# Patient Record
Sex: FEMALE | Race: WHITE | NOT HISPANIC OR LATINO | URBAN - METROPOLITAN AREA
[De-identification: names, ages, dates, MRNs, and addresses within clinical notes are randomized per-mention and may not be internally consistent; named-entity substitution may affect disease eponyms.]

---

## 2018-03-16 RX ORDER — CITALOPRAM 10 MG/1
TABLET ORAL
Qty: 90 TABLET | Refills: 3 | OUTPATIENT
Start: 2018-03-16

## 2018-03-16 RX ORDER — METFORMIN HYDROCHLORIDE 750 MG/1
TABLET, EXTENDED RELEASE ORAL
Qty: 180 TABLET | Refills: 3 | OUTPATIENT
Start: 2018-03-16

## 2018-03-16 RX ORDER — SPIRONOLACTONE 50 MG/1
TABLET, FILM COATED ORAL
Qty: 90 TABLET | Refills: 0 | OUTPATIENT
Start: 2018-03-16

## 2018-03-16 NOTE — TELEPHONE ENCOUNTER
Rx Refused: last seen over 1 year ago. Requested prescription has not been sent to pharmacy. Please contact patient to schedule an appointment. Offer 2 week supply if needed. Thanks.

## 2018-03-22 ENCOUNTER — TELEPHONE (OUTPATIENT)
Dept: FAMILY MEDICINE | Facility: CLINIC | Age: 38
End: 2018-03-22

## 2018-03-22 PROBLEM — L70.0 ACNE VULGARIS: Status: ACTIVE | Noted: 2018-03-22

## 2018-03-22 RX ORDER — CITALOPRAM 10 MG/1
10 TABLET ORAL DAILY
Qty: 30 TABLET | Refills: 0 | Status: SHIPPED | OUTPATIENT
Start: 2018-03-22 | End: 2018-04-09 | Stop reason: SDUPTHER

## 2018-03-22 RX ORDER — DAPSONE 50 MG/G
GEL TOPICAL
COMMUNITY
Start: 2015-05-01 | End: 2018-04-09 | Stop reason: ALTCHOICE

## 2018-03-22 RX ORDER — DROSPIRENONE AND ETHINYL ESTRADIOL 0.02-3(28)
KIT ORAL
COMMUNITY
Start: 2015-05-01 | End: 2018-04-09 | Stop reason: ALTCHOICE

## 2018-03-22 RX ORDER — TAZAROTENE 0.5 MG/G
CREAM TOPICAL
COMMUNITY
Start: 2015-05-01 | End: 2018-04-09 | Stop reason: ALTCHOICE

## 2018-03-22 RX ORDER — SPIRONOLACTONE 50 MG/1
1 TABLET, FILM COATED ORAL ONCE
COMMUNITY
Start: 2017-12-25 | End: 2018-03-22 | Stop reason: SDUPTHER

## 2018-03-22 RX ORDER — HYDROCORTISONE ACETATE 25 MG/1
SUPPOSITORY RECTAL
COMMUNITY
Start: 2015-05-01 | End: 2018-04-09 | Stop reason: ALTCHOICE

## 2018-03-22 RX ORDER — METFORMIN HYDROCHLORIDE 750 MG/1
TABLET, EXTENDED RELEASE ORAL
COMMUNITY
Start: 2017-02-13 | End: 2018-04-09 | Stop reason: SDUPTHER

## 2018-03-22 RX ORDER — SPIRONOLACTONE 50 MG/1
50 TABLET, FILM COATED ORAL DAILY
Qty: 30 TABLET | Refills: 0 | Status: SHIPPED | OUTPATIENT
Start: 2018-03-22 | End: 2018-04-09

## 2018-03-22 RX ORDER — CITALOPRAM 10 MG/1
1 TABLET ORAL ONCE
COMMUNITY
Start: 2017-02-13 | End: 2018-03-22 | Stop reason: SDUPTHER

## 2018-03-22 NOTE — TELEPHONE ENCOUNTER
Pt called requesting a weeks supply of her:  spironolactone 50 mg tablet and citalopram 10 mg tablet    She is completely out of her medication and has an apt on 3/29/2018    Thanks!

## 2018-04-09 ENCOUNTER — OFFICE VISIT (OUTPATIENT)
Dept: FAMILY MEDICINE | Facility: CLINIC | Age: 38
End: 2018-04-09
Payer: COMMERCIAL

## 2018-04-09 VITALS
DIASTOLIC BLOOD PRESSURE: 70 MMHG | SYSTOLIC BLOOD PRESSURE: 112 MMHG | TEMPERATURE: 98.3 F | BODY MASS INDEX: 30.48 KG/M2 | WEIGHT: 172 LBS | HEART RATE: 71 BPM | HEIGHT: 63 IN | OXYGEN SATURATION: 99 %

## 2018-04-09 DIAGNOSIS — Z00.00 ROUTINE MEDICAL EXAM: Primary | ICD-10-CM

## 2018-04-09 DIAGNOSIS — F41.9 ANXIETY DISORDER, UNSPECIFIED TYPE: ICD-10-CM

## 2018-04-09 DIAGNOSIS — L70.0 ACNE VULGARIS: ICD-10-CM

## 2018-04-09 DIAGNOSIS — Z01.419 PAP SMEAR, LOW-RISK: ICD-10-CM

## 2018-04-09 PROCEDURE — 99395 PREV VISIT EST AGE 18-39: CPT | Performed by: FAMILY MEDICINE

## 2018-04-09 RX ORDER — SPIRONOLACTONE 50 MG/1
50 TABLET, FILM COATED ORAL 2 TIMES DAILY
Qty: 180 TABLET | Refills: 3 | Status: SHIPPED | OUTPATIENT
Start: 2018-04-09 | End: 2019-04-05 | Stop reason: SDUPTHER

## 2018-04-09 RX ORDER — CITALOPRAM 10 MG/1
10 TABLET ORAL DAILY
Qty: 90 TABLET | Refills: 3 | Status: SHIPPED | OUTPATIENT
Start: 2018-04-09 | End: 2019-04-05 | Stop reason: SDUPTHER

## 2018-04-09 RX ORDER — METFORMIN HYDROCHLORIDE 750 MG/1
750 TABLET, EXTENDED RELEASE ORAL
Qty: 90 TABLET | Refills: 3 | Status: SHIPPED | OUTPATIENT
Start: 2018-04-09 | End: 2018-04-10 | Stop reason: SDUPTHER

## 2018-04-09 RX ORDER — SPIRONOLACTONE 50 MG/1
50 TABLET, FILM COATED ORAL 2 TIMES DAILY
Qty: 30 TABLET | Refills: 0 | COMMUNITY
Start: 2018-04-09 | End: 2018-04-09 | Stop reason: SDUPTHER

## 2018-04-09 ASSESSMENT — ENCOUNTER SYMPTOMS
DIARRHEA: 0
COLOR CHANGE: 0
VOMITING: 0
CONSTIPATION: 0
BLOOD IN STOOL: 0
NERVOUS/ANXIOUS: 0
COUGH: 0
SHORTNESS OF BREATH: 0
SORE THROAT: 0
CHILLS: 0
HEMATURIA: 0
PALPITATIONS: 0
DIZZINESS: 0
DIFFICULTY URINATING: 0
UNEXPECTED WEIGHT CHANGE: 0
TROUBLE SWALLOWING: 0
CHEST TIGHTNESS: 0
NAUSEA: 0
ADENOPATHY: 0
WHEEZING: 0
HEADACHES: 0
JOINT SWELLING: 0
ARTHRALGIAS: 0
ABDOMINAL PAIN: 0
FEVER: 0
EYE REDNESS: 0

## 2018-04-09 NOTE — PROGRESS NOTES
[facility]     CHIEF COMPLAINT   Treasure Winter is a 38 y.o. female who presents today for routine annual physical.   HISTORY OF PRESENT ILLNESS      Acne well managed on present regimen.         PAST MEDICAL AND SURGICAL HISTORY        No past medical history on file.    No past surgical history on file.    MEDICATIONS        Current Outpatient Prescriptions:   •  citalopram (CELEXA) 10 mg tablet, Take 1 tablet (10 mg total) by mouth daily., Disp: 90 tablet, Rfl: 3  •  metFORMIN XR (GLUCOPHAGE-XR) 750 mg 24 hr tablet, Take 1 tablet (750 mg total) by mouth daily with breakfast., Disp: 90 tablet, Rfl: 3  •  spironolactone (ALDACTONE) 50 mg tablet, Take 1 tablet (50 mg total) by mouth 2 (two) times a day., Disp: 180 tablet, Rfl: 3    ALLERGIES      Patient has no known allergies.    FAMILY HISTORY      Family History   Problem Relation Age of Onset   • Hypertension Mother    • Diabetes Mother        SOCIAL HISTORY      Social History     Social History   • Marital status: Single     Spouse name: N/A   • Number of children: N/A   • Years of education: N/A     Social History Main Topics   • Smoking status: Never Smoker   • Smokeless tobacco: Never Used   • Alcohol use No   • Drug use: Unknown   • Sexual activity: Not on file     Other Topics Concern   • Not on file     Social History Narrative   • No narrative on file       REVIEW OF SYSTEMS      .Review of Systems   Constitutional: Negative for chills, fever and unexpected weight change.   HENT: Negative for congestion, ear pain, sore throat and trouble swallowing.    Eyes: Negative for redness and visual disturbance.   Respiratory: Negative for cough, chest tightness, shortness of breath and wheezing.    Cardiovascular: Negative for chest pain, palpitations and leg swelling.   Gastrointestinal: Negative for abdominal pain, blood in stool, constipation, diarrhea, nausea and vomiting.   Genitourinary: Negative for difficulty urinating and hematuria.  "  Musculoskeletal: Negative for arthralgias and joint swelling.   Skin: Negative for color change and rash.        Acne vulgaris - face   Neurological: Negative for dizziness and headaches.   Hematological: Negative for adenopathy.   Psychiatric/Behavioral: The patient is not nervous/anxious.        PHYSICAL EXAMINATION      Vitals:    04/09/18 1552   BP: 112/70   Pulse: 71   Temp: 36.8 °C (98.3 °F)   SpO2: 99%   Weight: 78 kg (172 lb)   Height: 1.6 m (5' 3\")     Body mass index is 30.47 kg/m².    Physical Exam   Constitutional: She is oriented to person, place, and time. She appears well-developed and well-nourished.   HENT:   Head: Normocephalic.   Right Ear: External ear normal.   Left Ear: External ear normal.   Nose: Nose normal.   Mouth/Throat: Oropharynx is clear and moist.   Eyes: Conjunctivae are normal. Pupils are equal, round, and reactive to light.   Neck: Normal range of motion.   Cardiovascular: Normal rate, regular rhythm and normal heart sounds.    Pulmonary/Chest: Effort normal and breath sounds normal.   Musculoskeletal: Normal range of motion.   Neurological: She is alert and oriented to person, place, and time.   Skin: Skin is warm and dry.   Acne vulgaris presently well controlled.    Psychiatric: She has a normal mood and affect. Her behavior is normal.          ASSESSMENT AND PLAN   Assessment/Plan   Problem List Items Addressed This Visit     Anxiety disorder    Relevant Medications    citalopram (CELEXA) 10 mg tablet    Other Relevant Orders    CBC and Differential    Comprehensive metabolic panel    Lipid panel    Acne vulgaris    Relevant Orders    CBC and Differential    Comprehensive metabolic panel    Lipid panel      Other Visit Diagnoses     Routine medical exam    -  Primary    Relevant Orders    CBC and Differential    Comprehensive metabolic panel    Lipid panel    Pap smear, low-risk        Relevant Orders    Ambulatory referral to Gynecology           Health maintenance discussed " including:    Patient encouraged to increase activity gradually as tolerated with a goal of 150 min. of aerobic activity per weeks and 2 sessions of resistance exercise weekly. Counseled patient on healthy eating (high quality, low fat, low carb foods) and the importance of portion control.    Importance of completing recommended health maintenance measures.    Completing regular dental examinations and brushing and flossing daily.    Enhance safety by wearing seat belts, checking smoke and CO detectors and monitoring living space for potential fall risks.     Matt Krueger,   04/09/18  7:28 PM

## 2018-04-09 NOTE — PATIENT INSTRUCTIONS
Patient Education   Patient Education     Acne  Acne is a skin problem that causes small, red bumps (pimples). Acne happens when the tiny holes in your skin (pores) get blocked. Your pores may become red, sore, and swollen. They may also become infected. Acne is a common skin problem. It is especially common in teenagers. Acne usually goes away over time.  Follow these instructions at home:  Good skin care is the most important thing you can do to treat your acne. Take care of your skin as told by your doctor. You may be told to do these things:  · Wash your skin gently at least two times each day. You should also wash your skin:  ¨ After you exercise.  ¨ Before you go to bed.  · Use mild soap.  · Use a water-based skin moisturizer after you wash your skin.  · Use a sunscreen or sunblock with SPF 30 or greater. This is very important if you are using acne medicines.  · Choose cosmetics that will not plug your oil glands (are noncomedogenic).  Medicines  · Take over-the-counter and prescription medicines only as told by your doctor.  · If you were prescribed an antibiotic medicine, apply or take it as told by your doctor. Do not stop using the antibiotic even if your acne improves.  General instructions  · Keep your hair clean and off of your face. Shampoo your hair regularly. If you have oily hair, you may need to wash it every day.  · Avoid leaning your chin or forehead on your hands.  · Avoid wearing tight headbands or hats.  · Avoid picking or squeezing your pimples. That can make your acne worse and cause scarring.  · Keep all follow-up visits as told by your doctor. This is important.  · Shave gently. Only shave when it is necessary.  · Keep a food journal. This can help you to see if any foods are linked with your acne.  Contact a doctor if:  · Your acne is not better after eight weeks.  · Your acne gets worse.  · You have a large area of skin that is red or tender.  · You think that you are having side  effects from any acne medicine.  This information is not intended to replace advice given to you by your health care provider. Make sure you discuss any questions you have with your health care provider.  Document Released: 12/06/2012 Document Revised: 05/25/2017 Document Reviewed: 02/24/2016  [employer] Interactive Patient Education © 2018 [employer]       Generalized Anxiety Disorder, Adult  Generalized anxiety disorder (RAJI) is a mental health disorder. People with this condition constantly worry about everyday events. Unlike normal anxiety, worry related to RAJI is not triggered by a specific event. These worries also do not fade or get better with time. RAJI interferes with life functions, including relationships, work, and school.  RAJI can vary from mild to severe. People with severe RAJI can have intense waves of anxiety with physical symptoms (panic attacks).  What are the causes?  The exact cause of RAJI is not known.  What increases the risk?  This condition is more likely to develop in:  · Women.  · People who have a family history of anxiety disorders.  · People who are very shy.  · People who experience very stressful life events, such as the death of a loved one.  · People who have a very stressful family environment.  What are the signs or symptoms?  People with RAJI often worry excessively about many things in their lives, such as their health and family. They may also be overly concerned about:  · Doing well at work.  · Being on time.  · Natural disasters.  · Friendships.  Physical symptoms of RAJI include:  · Fatigue.  · Muscle tension or having muscle twitches.  · Trembling or feeling shaky.  · Being easily startled.  · Feeling like your heart is pounding or racing.  · Feeling out of breath or like you cannot take a deep breath.  · Having trouble falling asleep or staying asleep.  · Sweating.  · Nausea, diarrhea, or irritable bowel syndrome (IBS).  · Headaches.  · Trouble concentrating or remembering  facts.  · Restlessness.  · Irritability.  How is this diagnosed?  Your health care provider can diagnose RAJI based on your symptoms and medical history. You will also have a physical exam. The health care provider will ask specific questions about your symptoms, including how severe they are, when they started, and if they come and go. Your health care provider may ask you about your use of alcohol or drugs, including prescription medicines. Your health care provider may refer you to a mental health specialist for further evaluation.  Your health care provider will do a thorough examination and may perform additional tests to rule out other possible causes of your symptoms.  To be diagnosed with RAJI, a person must have anxiety that:  · Is out of his or her control.  · Affects several different aspects of his or her life, such as work and relationships.  · Causes distress that makes him or her unable to take part in normal activities.  · Includes at least three physical symptoms of RAJI, such as restlessness, fatigue, trouble concentrating, irritability, muscle tension, or sleep problems.  Before your health care provider can confirm a diagnosis of RAJI, these symptoms must be present more days than they are not, and they must last for six months or longer.  How is this treated?  The following therapies are usually used to treat RAJI:  · Medicine. Antidepressant medicine is usually prescribed for long-term daily control. Antianxiety medicines may be added in severe cases, especially when panic attacks occur.  · Talk therapy (psychotherapy). Certain types of talk therapy can be helpful in treating RAJI by providing support, education, and guidance. Options include:  ¨ Cognitive behavioral therapy (CBT). People learn coping skills and techniques to ease their anxiety. They learn to identify unrealistic or negative thoughts and behaviors and to replace them with positive ones.  ¨ Acceptance and commitment therapy (ACT).  This treatment teaches people how to be mindful as a way to cope with unwanted thoughts and feelings.  ¨ Biofeedback. This process trains you to manage your body's response (physiological response) through breathing techniques and relaxation methods. You will work with a therapist while machines are used to monitor your physical symptoms.  · Stress management techniques. These include yoga, meditation, and exercise.  A mental health specialist can help determine which treatment is best for you. Some people see improvement with one type of therapy. However, other people require a combination of therapies.  Follow these instructions at home:  · Take over-the-counter and prescription medicines only as told by your health care provider.  · Try to maintain a normal routine.  · Try to anticipate stressful situations and allow extra time to manage them.  · Practice any stress management or self-calming techniques as taught by your health care provider.  · Do not punish yourself for setbacks or for not making progress.  · Try to recognize your accomplishments, even if they are small.  · Keep all follow-up visits as told by your health care provider. This is important.  Contact a health care provider if:  · Your symptoms do not get better.  · Your symptoms get worse.  · You have signs of depression, such as:  ¨ A persistently sad, cranky, or irritable mood.  ¨ Loss of enjoyment in activities that used to bring you bob.  ¨ Change in weight or eating.  ¨ Changes in sleeping habits.  ¨ Avoiding friends or family members.  ¨ Loss of energy for normal tasks.  ¨ Feelings of guilt or worthlessness.  Get help right away if:  · You have serious thoughts about hurting yourself or others.  If you ever feel like you may hurt yourself or others, or have thoughts about taking your own life, get help right away. You can go to your nearest emergency department or call:  · Your local emergency services (911 in the [place]  · A suicide crisis  helpline, such as the National Suicide Prevention Lifeline at 1-978.260.7735. This is open 24 hours a day.  Summary  · Generalized anxiety disorder (RAJI) is a mental health disorder that involves worry that is not triggered by a specific event.  · People with RAJI often worry excessively about many things in their lives, such as their health and family.  · RAJI may cause physical symptoms such as restlessness, trouble concentrating, sleep problems, frequent sweating, nausea, diarrhea, headaches, and trembling or muscle twitching.  · A mental health specialist can help determine which treatment is best for you. Some people see improvement with one type of therapy. However, other people require a combination of therapies.  This information is not intended to replace advice given to you by your health care provider. Make sure you discuss any questions you have with your health care provider.  Document Released: 04/14/2014 Document Revised: 11/07/2017 Document Reviewed: 11/07/2017  [employer] Interactive Patient Education © 2018 [employer]

## 2018-04-09 NOTE — ASSESSMENT & PLAN NOTE
Anxiety: stop or reduce your consumption of products that contain caffeine, such as coffee, tea, cola and chocolate. Incorporate regular exercise into your lifestyle. Consume a calorie appropriate diet of high quality, low fat, low carb foods. Participate in counseling as needed. Practice stress management techniques such as yoga, meditation or martial arts. Consider keeping a journal. Seek the assistance of a psychologist or psychiatrist if the above are not improving condition.

## 2018-04-10 ENCOUNTER — TELEPHONE (OUTPATIENT)
Dept: FAMILY MEDICINE | Facility: CLINIC | Age: 38
End: 2018-04-10

## 2018-04-10 RX ORDER — METFORMIN HYDROCHLORIDE 750 MG/1
750 TABLET, EXTENDED RELEASE ORAL 2 TIMES DAILY WITH MEALS
Qty: 180 TABLET | Refills: 3 | Status: SHIPPED | OUTPATIENT
Start: 2018-04-10 | End: 2019-04-05 | Stop reason: SDUPTHER

## 2018-04-10 NOTE — TELEPHONE ENCOUNTER
metFORMIN XR (GLUCOPHAGE-XR) 750 mg 24 hr tablet        Sig: Take 1 tablet (750 mg total) by mouth daily with           Pharmacy called about the above medication. They are inquiring about the daily instructions. Pharm stated she was taking the med 2x daily and wants to know why is it down to 1x daily

## 2018-04-10 NOTE — TELEPHONE ENCOUNTER
Please inform the pharmacy that a new prescription has been sent with twice daily dosing.  Thank you

## 2018-04-10 NOTE — TELEPHONE ENCOUNTER
Please inform the pharmacy that a new prescription has been sent with twice daily dosing.  Thank you.

## 2018-04-17 ENCOUNTER — TELEPHONE (OUTPATIENT)
Dept: FAMILY MEDICINE | Facility: CLINIC | Age: 38
End: 2018-04-17

## 2018-04-17 DIAGNOSIS — Z12.39 BREAST SCREENING: ICD-10-CM

## 2018-04-17 DIAGNOSIS — Z01.419 PAP SMEAR, LOW-RISK: Primary | ICD-10-CM

## 2018-04-17 NOTE — TELEPHONE ENCOUNTER
Pt called requesting a Rx for a mammo and an order to go to the Ob-Gyn per her insurance company's request      Thanks!

## 2019-01-16 ENCOUNTER — OFFICE VISIT (OUTPATIENT)
Dept: FAMILY MEDICINE | Facility: CLINIC | Age: 39
End: 2019-01-16
Payer: COMMERCIAL

## 2019-01-16 VITALS
BODY MASS INDEX: 31.89 KG/M2 | SYSTOLIC BLOOD PRESSURE: 102 MMHG | TEMPERATURE: 98.5 F | OXYGEN SATURATION: 97 % | DIASTOLIC BLOOD PRESSURE: 72 MMHG | WEIGHT: 180 LBS | HEIGHT: 63 IN | HEART RATE: 88 BPM

## 2019-01-16 DIAGNOSIS — Z12.39 BREAST CANCER SCREENING: ICD-10-CM

## 2019-01-16 DIAGNOSIS — E28.2 POLYCYSTIC OVARIES: Primary | ICD-10-CM

## 2019-01-16 DIAGNOSIS — F41.9 ANXIETY DISORDER, UNSPECIFIED TYPE: ICD-10-CM

## 2019-01-16 DIAGNOSIS — L70.0 ACNE VULGARIS: ICD-10-CM

## 2019-01-16 DIAGNOSIS — Z00.00 GENERAL MEDICAL EXAMINATION: ICD-10-CM

## 2019-01-16 DIAGNOSIS — R00.2 PALPITATION: ICD-10-CM

## 2019-01-16 PROCEDURE — 99213 OFFICE O/P EST LOW 20 MIN: CPT | Mod: GE | Performed by: FAMILY MEDICINE

## 2019-01-16 NOTE — ASSESSMENT & PLAN NOTE
Well controlled with spironolactone  Ordered labs for evaluation of K especially given the recent history of palpitations

## 2019-01-16 NOTE — PROGRESS NOTES
"     [facility]     CHIEF COMPLAINT   Follow [place]     HISTORY OF PRESENT ILLNESS      This is a 38 y.o. female who presents for follow up before leaving the area. Would like to ensure is UTD with HCM.     Chest tightness which occurs \"out of the blue\" for months. Usually when she is inhaling and passes within a few seconds. Denies n/v, dizziness. Pain is L sided and stops her breath. Can occur at rest or during exertion. Unsure if heartbeat is irregular, not sure. No excessive caffeine intake. Drinks one cup of coffee during the day.     PAST MEDICAL AND SURGICAL HISTORY      No past medical history on file.    No past surgical history on file.    MEDICATIONS      No current outpatient prescriptions on file.    ALLERGIES      Patient has no known allergies.    FAMILY HISTORY      Family History   Problem Relation Age of Onset   • Hypertension Mother    • Diabetes Mother    • Hyperlipidemia Mother    • Arthritis Mother        SOCIAL HISTORY      Social History     Social History   • Marital status: Single     Spouse name: N/A   • Number of children: N/A   • Years of education: N/A     Social History Main Topics   • Smoking status: Never Smoker   • Smokeless tobacco: Never Used   • Alcohol use No   • Drug use: Unknown   • Sexual activity: Not on file     Other Topics Concern   • Not on file     Social History Narrative   • No narrative on file       REVIEW OF SYSTEMS      Review of Systems   Constitutional: Negative for chills and fever.   HENT: Negative for congestion, ear discharge, ear pain, postnasal drip, rhinorrhea and sore throat.    Eyes: Negative for discharge and itching.   Respiratory: Positive for shortness of breath (transient). Negative for cough and wheezing.    Cardiovascular: Positive for palpitations. Negative for chest pain and leg swelling.   Gastrointestinal: Negative for abdominal pain, constipation, diarrhea, nausea and vomiting.   Genitourinary: " "Negative for dysuria and hematuria.   Skin: Negative for rash.   Neurological: Negative for dizziness and headaches.       PHYSICAL EXAMINATION      /72   Pulse 88   Temp 36.9 °C (98.5 °F)   Ht 1.6 m (5' 3\")   Wt 81.6 kg (180 lb)   SpO2 97%   BMI 31.89 kg/m²   Body mass index is 31.89 kg/m².    Physical Exam   Constitutional: She is oriented to person, place, and time. She appears well-developed and well-nourished.   HENT:   Head: Normocephalic and atraumatic.   Eyes: Conjunctivae and EOM are normal. Pupils are equal, round, and reactive to light.   Cardiovascular: Normal rate, regular rhythm and normal heart sounds.    No murmur heard.  Heart auscultated for 60 sec without skipped beat or irregularities   Pulmonary/Chest: Effort normal and breath sounds normal. No respiratory distress. She has no wheezes.   Abdominal: Soft. Bowel sounds are normal.   Musculoskeletal: She exhibits no edema.   Neurological: She is alert and oriented to person, place, and time.   Skin: Skin is warm and dry.   Psychiatric: She has a normal mood and affect. Her behavior is normal.   Vitals reviewed.      LABS / IMAGING / STUDIES        Labs    No results found for: CREATININE  No results found for: WBC, HGB, HCT, MCV, PLT      No results found for: HGBA1C  No results found for: PEDRO PABLO [id number]        HEALTH MAINTENANCE           PAP Smear: 6/13/2018 with [facility]  Mammogram: referred today  Tdap: UTD  Flu: 11/2018, [facility]  Dentist: every 6 months  Ophthalmology: appointment tomorrow    Last PE: 4/2018  Last Labs: will order today       ASSESSMENT AND PLAN   Problem List Items Addressed This Visit     Anxiety disorder     Symptoms stable, patient feels well adjusted  Continue Celexa         Relevant Orders    Comprehensive metabolic panel    Lipid panel    CBC and differential    TSH w reflex FT4    Polycystic ovaries - Primary     No longer follows with specialist  May consider cannabis treatment in " future         Relevant Orders    Comprehensive metabolic panel    Lipid panel    CBC and differential    Acne vulgaris     Well controlled with spironolactone  Ordered labs for evaluation of K especially given the recent history of palpitations         Relevant Orders    Comprehensive metabolic panel    Lipid panel    CBC and differential    TSH w reflex FT4      Other Visit Diagnoses     Breast cancer screening        Relevant Orders    BI SCREENING MAMMOGRAM BILATERAL    General medical examination        Relevant Orders    Comprehensive metabolic panel    Lipid panel    CBC and differential    Palpitation        Advised f/u with cardiology for consideration of sx PVCs  Will discuss monitor for evaluation with cardiology  Ordered TSH with reflex T4    Relevant Orders    Ambulatory referral to Cardiology    TSH w reflex FT4             Elva Win,   01/18/19

## 2019-01-16 NOTE — PATIENT INSTRUCTIONS
Patient Education     Palpitations  A palpitation is the feeling that your heart:  · Has an uneven (irregular) heartbeat.  · Is beating faster than normal.  · Is fluttering.  · Is skipping a beat.  This is usually not a serious problem. In some cases, you may need more medical tests.  Follow these instructions at home:  · Avoid:  ¨ Caffeine in coffee, tea, soft drinks, diet pills, and energy drinks.  ¨ Chocolate.  ¨ Alcohol.  · Do not use any tobacco products. These include cigarettes, chewing tobacco, and e-cigarettes. If you need help quitting, ask your doctor.  · Try to reduce your stress. These things may help:  ¨ Yoga.  ¨ Meditation.  ¨ Physical activity. Swimming, jogging, and walking are good choices.  ¨ A method that helps you use your mind to control things in your body, like heartbeats (biofeedback).  · Get plenty of rest and sleep.  · Take over-the-counter and prescription medicines only as told by your doctor.  · Keep all follow-up visits as told by your doctor. This is important.  Contact a doctor if:  · Your heartbeat is still fast or uneven after 24 hours.  · Your palpitations occur more often.  Get help right away if:  · You have chest pain.  · You feel short of breath.  · You have a very bad headache.  · You feel dizzy.  · You pass out (faint).  This information is not intended to replace advice given to you by your health care provider. Make sure you discuss any questions you have with your health care provider.  Document Released: 09/26/2009 Document Revised: 05/25/2017 Document Reviewed: 09/01/2016  [employer] Interactive Patient Education © 2018 [employer]

## 2019-01-18 ASSESSMENT — ENCOUNTER SYMPTOMS
VOMITING: 0
EYE ITCHING: 0
SHORTNESS OF BREATH: 1
HEMATURIA: 0
PALPITATIONS: 1
DIZZINESS: 0
WHEEZING: 0
ABDOMINAL PAIN: 0
DIARRHEA: 0
CONSTIPATION: 0
HEADACHES: 0
DYSURIA: 0
SORE THROAT: 0
RHINORRHEA: 0
NAUSEA: 0
EYE DISCHARGE: 0
COUGH: 0
CHILLS: 0
FEVER: 0

## 2019-01-23 LAB
ALBUMIN SERPL-MCNC: 4.3 G/DL (ref 3.5–5.5)
ALBUMIN/GLOB SERPL: 1.8 {RATIO} (ref 1.2–2.2)
ALP SERPL-CCNC: 69 IU/L (ref 39–117)
ALT SERPL-CCNC: 9 IU/L (ref 0–32)
AST SERPL-CCNC: 16 IU/L (ref 0–40)
BASOPHILS # BLD AUTO: 0 X10E3/UL (ref 0–0.2)
BASOPHILS NFR BLD AUTO: 0 %
BILIRUB SERPL-MCNC: 0.4 MG/DL (ref 0–1.2)
BUN SERPL-MCNC: 7 MG/DL (ref 6–20)
BUN/CREAT SERPL: 10 (ref 9–23)
CALCIUM SERPL-MCNC: 9 MG/DL (ref 8.7–10.2)
CHLORIDE SERPL-SCNC: 102 MMOL/L (ref 96–106)
CHOLEST SERPL-MCNC: 189 MG/DL (ref 100–199)
CO2 SERPL-SCNC: 26 MMOL/L (ref 20–29)
CREAT SERPL-MCNC: 0.73 MG/DL (ref 0.57–1)
EOSINOPHIL # BLD AUTO: 0.3 X10E3/UL (ref 0–0.4)
EOSINOPHIL NFR BLD AUTO: 3 %
ERYTHROCYTE [DISTWIDTH] IN BLOOD BY AUTOMATED COUNT: 13.7 % (ref 12.3–15.4)
GLOBULIN SER CALC-MCNC: 2.4 G/DL (ref 1.5–4.5)
GLUCOSE SERPL-MCNC: 92 MG/DL (ref 65–99)
HCT VFR BLD AUTO: 37.2 % (ref 34–46.6)
HDLC SERPL-MCNC: 57 MG/DL
HGB BLD-MCNC: 11.9 G/DL (ref 11.1–15.9)
IMM GRANULOCYTES # BLD AUTO: 0 X10E3/UL (ref 0–0.1)
IMM GRANULOCYTES NFR BLD AUTO: 0 %
LAB CORP EGFR IF AFRICN AM: 121 ML/MIN/1.73
LAB CORP EGFR IF NONAFRICN AM: 105 ML/MIN/1.73
LDLC SERPL CALC-MCNC: 107 MG/DL (ref 0–99)
LYMPHOCYTES # BLD AUTO: 1.9 X10E3/UL (ref 0.7–3.1)
LYMPHOCYTES NFR BLD AUTO: 26 %
MCH RBC QN AUTO: 26.6 PG (ref 26.6–33)
MCHC RBC AUTO-ENTMCNC: 32 G/DL (ref 31.5–35.7)
MCV RBC AUTO: 83 FL (ref 79–97)
MONOCYTES # BLD AUTO: 0.6 X10E3/UL (ref 0.1–0.9)
MONOCYTES NFR BLD AUTO: 8 %
NEUTROPHILS # BLD AUTO: 4.6 X10E3/UL (ref 1.4–7)
NEUTROPHILS NFR BLD AUTO: 63 %
PLATELET # BLD AUTO: 272 X10E3/UL (ref 150–379)
POTASSIUM SERPL-SCNC: 4.5 MMOL/L (ref 3.5–5.2)
PROT SERPL-MCNC: 6.7 G/DL (ref 6–8.5)
RBC # BLD AUTO: 4.47 X10E6/UL (ref 3.77–5.28)
SODIUM SERPL-SCNC: 141 MMOL/L (ref 134–144)
TRIGL SERPL-MCNC: 126 MG/DL (ref 0–149)
VLDLC SERPL CALC-MCNC: 25 MG/DL (ref 5–40)
WBC # BLD AUTO: 7.4 X10E3/UL (ref 3.4–10.8)

## 2019-01-25 LAB
SPECIMEN STATUS: NORMAL
TSH SERPL DL<=0.005 MIU/L-ACNC: 1.84 UIU/ML (ref 0.45–4.5)

## 2019-02-19 ENCOUNTER — TELEPHONE (OUTPATIENT)
Dept: FAMILY MEDICINE | Facility: CLINIC | Age: 39
End: 2019-02-19

## 2019-02-19 NOTE — TELEPHONE ENCOUNTER
Called to inform patient of lab results which are all WNL with the exception of elevated LDL. Patient states that she will alter diet and lifestyle. Currently on keto diet. Will monitor labs annually.     Elva Win, DO

## 2019-04-05 RX ORDER — SPIRONOLACTONE 50 MG/1
TABLET, FILM COATED ORAL
Qty: 180 TABLET | Refills: 3 | Status: SHIPPED | OUTPATIENT
Start: 2019-04-05 | End: 2020-02-21 | Stop reason: SDUPTHER

## 2019-04-05 RX ORDER — METFORMIN HYDROCHLORIDE 750 MG/1
TABLET, EXTENDED RELEASE ORAL
Qty: 180 TABLET | Refills: 3 | Status: SHIPPED | OUTPATIENT
Start: 2019-04-05 | End: 2020-02-20

## 2019-04-05 RX ORDER — CITALOPRAM 10 MG/1
TABLET ORAL
Qty: 90 TABLET | Refills: 3 | Status: SHIPPED | OUTPATIENT
Start: 2019-04-05 | End: 2020-02-20

## 2019-04-10 RX ORDER — SPIRONOLACTONE 50 MG/1
TABLET, FILM COATED ORAL
Qty: 180 TABLET | Refills: 3 | Status: SHIPPED | OUTPATIENT
Start: 2019-04-10 | End: 2020-02-21 | Stop reason: SDUPTHER

## 2019-06-07 ENCOUNTER — TELEPHONE (OUTPATIENT)
Dept: FAMILY MEDICINE | Facility: CLINIC | Age: 39
End: 2019-06-07

## 2019-06-07 RX ORDER — METHYLPREDNISOLONE 4 MG/1
TABLET ORAL
Qty: 21 TABLET | Refills: 0 | Status: SHIPPED | OUTPATIENT
Start: 2019-06-07 | End: 2019-06-14

## 2019-06-07 NOTE — TELEPHONE ENCOUNTER
Patient called on call answering service saying she injured her back. Injured it yesterday while putting cat in cage to take to vet. Feels bilateral pain radiating down her legs worse on right. No bowel/bladder incontinence, saddle anesthesia, leg weakness. Tried ibuprofen which helped temporarily. Will send medrol dose pack. No NSAIDs while on steroids. Tylenol for any further pain. Ice prn. Patient agreeable to plan. Will make appt next week if no improvement.

## 2019-06-07 NOTE — TELEPHONE ENCOUNTER
Received call via on call answering service about patient having back pain. Returned call. Went to VM- left message to call back office if she needed to still speak to on call doctor.

## 2019-07-12 ENCOUNTER — OFFICE VISIT (OUTPATIENT)
Dept: FAMILY MEDICINE | Facility: CLINIC | Age: 39
End: 2019-07-12
Payer: COMMERCIAL

## 2019-07-12 VITALS
HEIGHT: 63 IN | WEIGHT: 179 LBS | BODY MASS INDEX: 31.71 KG/M2 | SYSTOLIC BLOOD PRESSURE: 126 MMHG | HEART RATE: 81 BPM | TEMPERATURE: 98.6 F | OXYGEN SATURATION: 99 % | DIASTOLIC BLOOD PRESSURE: 76 MMHG

## 2019-07-12 DIAGNOSIS — L70.0 ACNE VULGARIS: Primary | ICD-10-CM

## 2019-07-12 DIAGNOSIS — F41.9 ANXIETY DISORDER, UNSPECIFIED TYPE: ICD-10-CM

## 2019-07-12 DIAGNOSIS — E28.2 POLYCYSTIC OVARIES: ICD-10-CM

## 2019-07-12 PROCEDURE — 99214 OFFICE O/P EST MOD 30 MIN: CPT | Performed by: FAMILY MEDICINE

## 2019-07-12 ASSESSMENT — ENCOUNTER SYMPTOMS
TROUBLE SWALLOWING: 0
DIFFICULTY URINATING: 0
ABDOMINAL PAIN: 0
HEMATURIA: 0
CHEST TIGHTNESS: 0
CONSTIPATION: 0
NERVOUS/ANXIOUS: 0
NAUSEA: 0
HEADACHES: 0
JOINT SWELLING: 0
CHILLS: 0
BLOOD IN STOOL: 0
EYE REDNESS: 0
WHEEZING: 0
UNEXPECTED WEIGHT CHANGE: 0
ARTHRALGIAS: 0
ROS SKIN COMMENTS: ACNE
VOMITING: 0
DIZZINESS: 0
FEVER: 0
COUGH: 0
SORE THROAT: 0
COLOR CHANGE: 0
DIARRHEA: 0
PALPITATIONS: 0
ADENOPATHY: 0
SHORTNESS OF BREATH: 0

## 2019-07-12 NOTE — ASSESSMENT & PLAN NOTE
Patient's acne is currently controlled with present treatment regimen. Patient advised to continue to wash face daily with a lower pH soap (Cetaphil) by applying with fingers and rinsing with warm (not hot) water. Any topical treatments can be applied after washing face. Regular use of daily topical benzoyl peroxide to the affected areas is recommended. If prescribed, never apply a topical tretinoin product at the same time as benzoyl peroxide. Take any prescribed or over the counter medications as directed.

## 2019-07-12 NOTE — PROGRESS NOTES
[facility]     HISTORY OF PRESENT ILLNESS      Treasure Winter is a 39 y.o. female who presents today for evaluation of chronic conditions.    Labs  Lab Results   Component Value Date    CREATININE 0.73 01/22/2019     Lab Results   Component Value Date    WBC 7.4 01/22/2019    HGB 11.9 01/22/2019    HCT 37.2 01/22/2019    MCV 83 01/22/2019     01/22/2019         No results found for: HGBA1C  No results found for: MICROALBUR, [id number]    Patient Active Problem List:     Anxiety disorder: stable     Polycystic ovaries: stable     Acne vulgaris: stable            PAST MEDICAL AND SURGICAL HISTORY        No past medical history on file.    No past surgical history on file.    MEDICATIONS        Current Outpatient Prescriptions:   •  citalopram (celeXA) 10 mg tablet, take 1 tablet by mouth once daily, Disp: 90 tablet, Rfl: 3  •  metFORMIN XR (GLUCOPHAGE-XR) 750 mg 24 hr tablet, take 1 tablet by mouth twice a day with food, Disp: 180 tablet, Rfl: 3  •  spironolactone (ALDACTONE) 50 mg tablet, take 1 tablet by mouth twice a day, Disp: 180 tablet, Rfl: 3  •  spironolactone (ALDACTONE) 50 mg tablet, take 1 tablet by mouth twice a day, Disp: 180 tablet, Rfl: 3    ALLERGIES      Patient has no known allergies.    FAMILY HISTORY      Family History   Problem Relation Age of Onset   • Hypertension Mother    • Diabetes Mother    • Hyperlipidemia Mother    • Arthritis Mother        SOCIAL HISTORY      Social History     Social History   • Marital status: Single     Spouse name: N/A   • Number of children: N/A   • Years of education: N/A     Social History Main Topics   • Smoking status: Never Smoker   • Smokeless tobacco: Never Used   • Alcohol use No   • Drug use: Unknown   • Sexual activity: Not on file     Other Topics Concern   • Not on file     Social History Narrative   • No narrative on file       REVIEW OF SYSTEMS      .Review of Systems   Constitutional: Negative for chills, fever and unexpected  "weight change.   HENT: Negative for congestion, ear pain, sore throat and trouble swallowing.    Eyes: Negative for redness and visual disturbance.   Respiratory: Negative for cough, chest tightness, shortness of breath and wheezing.    Cardiovascular: Negative for chest pain, palpitations and leg swelling.   Gastrointestinal: Negative for abdominal pain, blood in stool, constipation, diarrhea, nausea and vomiting.   Genitourinary: Negative for difficulty urinating and hematuria.   Musculoskeletal: Negative for arthralgias and joint swelling.   Skin: Negative for color change and rash.        acne   Neurological: Negative for dizziness and headaches.   Hematological: Negative for adenopathy.   Psychiatric/Behavioral: The patient is not nervous/anxious.        PHYSICAL EXAMINATION      Vitals:    07/12/19 1147   BP: 126/76   Pulse: 81   Temp: 37 °C (98.6 °F)   SpO2: 99%   Weight: 81.2 kg (179 lb)   Height: 1.6 m (5' 3\")     Body mass index is 31.71 kg/m².    Physical Exam   Constitutional: She is oriented to person, place, and time. She appears well-developed and well-nourished.   HENT:   Head: Normocephalic.   Right Ear: Tympanic membrane, external ear and ear canal normal.   Left Ear: Tympanic membrane, external ear and ear canal normal.   Nose: Nose normal.   Mouth/Throat: Oropharynx is clear and moist.   Eyes: Pupils are equal, round, and reactive to light. Conjunctivae are normal.   Cardiovascular: Normal rate, regular rhythm and normal heart sounds.    Pulmonary/Chest: Effort normal and breath sounds normal.   Musculoskeletal: Normal range of motion.   Neurological: She is alert and oriented to person, place, and time.   Skin:   acne   Psychiatric: She has a normal mood and affect. Her behavior is normal.          ASSESSMENT AND PLAN   Assessment/Plan   Problem List Items Addressed This Visit     Anxiety disorder     Discussed with patient stopping or reducing consumption of products that contain caffeine, such " as coffee, tea, cola and chocolate. Incorporate regular exercise into your lifestyle. Consume a calorie appropriate diet of high quality, low fat, low carb foods. Practice stress management techniques such as yoga, meditation or martial arts. Consider keeping a journal. Seek the assistance of a psychologist or psychiatrist if the above are not improving condition. Take any prescribed medications as directed. Contact the office with any worsening of symptoms.             Polycystic ovaries     This condition is primarily being managed by specialty consultants. We have discussed the available consultant's reports. The patient has been instructed to maintain appropriate follow up with health care team as directed. Take all prescribed or over the counter medications as directed.          Acne vulgaris - Primary     Patient's acne is currently controlled with present treatment regimen. Patient advised to continue to wash face daily with a lower pH soap (Cetaphil) by applying with fingers and rinsing with warm (not hot) water. Any topical treatments can be applied after washing face. Regular use of daily topical benzoyl peroxide to the affected areas is recommended. If prescribed, never apply a topical tretinoin product at the same time as benzoyl peroxide. Take any prescribed or over the counter medications as directed.                   Matt Krueger,   07/12/19  12:45 PM

## 2019-07-12 NOTE — ASSESSMENT & PLAN NOTE
Discussed with patient stopping or reducing consumption of products that contain caffeine, such as coffee, tea, cola and chocolate. Incorporate regular exercise into your lifestyle. Consume a calorie appropriate diet of high quality, low fat, low carb foods. Practice stress management techniques such as yoga, meditation or martial arts. Consider keeping a journal. Seek the assistance of a psychologist or psychiatrist if the above are not improving condition. Take any prescribed medications as directed. Contact the office with any worsening of symptoms.

## 2019-07-12 NOTE — ASSESSMENT & PLAN NOTE
This condition is primarily being managed by specialty consultants. We have discussed the available consultant's reports. The patient has been instructed to maintain appropriate follow up with health care team as directed. Take all prescribed or over the counter medications as directed.

## 2019-08-14 ENCOUNTER — TELEPHONE (OUTPATIENT)
Dept: FAMILY MEDICINE | Facility: CLINIC | Age: 39
End: 2019-08-14

## 2019-08-14 NOTE — TELEPHONE ENCOUNTER
Tried calling patient twice in regards to getting her scheduled for a sick appointment tomorrow. Dr Marino's 9:40 am appointment. Lvm and reached out to patient on the portal

## 2020-02-20 RX ORDER — CITALOPRAM 10 MG/1
TABLET ORAL
Qty: 90 TABLET | Refills: 3 | Status: SHIPPED | OUTPATIENT
Start: 2020-02-20 | End: 2021-01-21 | Stop reason: SDUPTHER

## 2020-02-20 RX ORDER — METFORMIN HYDROCHLORIDE 750 MG/1
TABLET, EXTENDED RELEASE ORAL
Qty: 180 TABLET | Refills: 3 | Status: SHIPPED | OUTPATIENT
Start: 2020-02-20 | End: 2021-01-21 | Stop reason: SDUPTHER

## 2020-02-21 RX ORDER — SPIRONOLACTONE 50 MG/1
50 TABLET, FILM COATED ORAL
Qty: 180 TABLET | Refills: 3 | Status: SHIPPED | OUTPATIENT
Start: 2020-02-21 | End: 2021-01-21 | Stop reason: SDUPTHER

## 2020-08-27 RX ORDER — CITALOPRAM 10 MG/1
TABLET ORAL
Qty: 90 TABLET | Refills: 3 | OUTPATIENT
Start: 2020-08-27

## 2020-08-27 RX ORDER — METFORMIN HYDROCHLORIDE 750 MG/1
TABLET, EXTENDED RELEASE ORAL
Qty: 180 TABLET | Refills: 3 | OUTPATIENT
Start: 2020-08-27

## 2020-12-04 RX ORDER — SPIRONOLACTONE 50 MG/1
TABLET, FILM COATED ORAL
Qty: 180 TABLET | Refills: 3 | OUTPATIENT
Start: 2020-12-04

## 2021-01-19 ENCOUNTER — OFFICE VISIT (OUTPATIENT)
Dept: FAMILY MEDICINE | Facility: CLINIC | Age: 41
End: 2021-01-19
Payer: COMMERCIAL

## 2021-01-19 VITALS
WEIGHT: 194 LBS | HEART RATE: 93 BPM | OXYGEN SATURATION: 99 % | SYSTOLIC BLOOD PRESSURE: 106 MMHG | BODY MASS INDEX: 34.37 KG/M2 | TEMPERATURE: 96.5 F | DIASTOLIC BLOOD PRESSURE: 68 MMHG

## 2021-01-19 DIAGNOSIS — Z00.00 ROUTINE MEDICAL EXAM: Primary | ICD-10-CM

## 2021-01-19 DIAGNOSIS — E66.811 CLASS 1 OBESITY DUE TO EXCESS CALORIES WITHOUT SERIOUS COMORBIDITY WITH BODY MASS INDEX (BMI) OF 34.0 TO 34.9 IN ADULT: ICD-10-CM

## 2021-01-19 DIAGNOSIS — E66.09 CLASS 1 OBESITY DUE TO EXCESS CALORIES WITHOUT SERIOUS COMORBIDITY WITH BODY MASS INDEX (BMI) OF 34.0 TO 34.9 IN ADULT: ICD-10-CM

## 2021-01-19 PROCEDURE — 99396 PREV VISIT EST AGE 40-64: CPT | Performed by: FAMILY MEDICINE

## 2021-01-19 ASSESSMENT — ENCOUNTER SYMPTOMS
ABDOMINAL PAIN: 0
ADENOPATHY: 0
NERVOUS/ANXIOUS: 0
SHORTNESS OF BREATH: 0
JOINT SWELLING: 0
WHEEZING: 0
CHEST TIGHTNESS: 0
DIFFICULTY URINATING: 0
PALPITATIONS: 0
CHILLS: 0
NAUSEA: 0
HEADACHES: 0
UNEXPECTED WEIGHT CHANGE: 0
EYE REDNESS: 0
HEMATURIA: 0
FEVER: 0
SORE THROAT: 0
DIZZINESS: 0
COUGH: 0
TROUBLE SWALLOWING: 0
BLOOD IN STOOL: 0
CONSTIPATION: 0
VOMITING: 0
COLOR CHANGE: 0
DIARRHEA: 0
ARTHRALGIAS: 0

## 2021-01-19 NOTE — PROGRESS NOTES
[facility]     CHIEF COMPLAINT   Treasure Winter is a 40 y.o. female who presents today for routine annual physical.   HISTORY OF PRESENT ILLNESS      HPI    Labs  Lab Results   Component Value Date    CREATININE 0.73 01/22/2019     Lab Results   Component Value Date    WBC 7.4 01/22/2019    HGB 11.9 01/22/2019    HCT 37.2 01/22/2019    MCV 83 01/22/2019     01/22/2019         No results found for: HGBA1C  No results found for: MICROALBUR, [id number]    PAST MEDICAL AND SURGICAL HISTORY        History reviewed. No pertinent past medical history.    History reviewed. No pertinent surgical history.    MEDICATIONS        Current Outpatient Medications:   •  citalopram (celeXA) 10 mg tablet, take 1 tablet by mouth once daily, Disp: 90 tablet, Rfl: 3  •  metFORMIN XR (GLUCOPHAGE-XR) 750 mg 24 hr tablet, take 1 tablet by mouth twice a day with food, Disp: 180 tablet, Rfl: 3  •  spironolactone (ALDACTONE) 50 mg tablet, Take 1 tablet (50 mg total) by mouth 2 (two) times a day., Disp: 180 tablet, Rfl: 3    ALLERGIES      Patient has no known allergies.    FAMILY HISTORY      Family History   Problem Relation Age of Onset   • Hypertension Biological Mother    • Diabetes Biological Mother    • Hyperlipidemia Biological Mother    • Arthritis Biological Mother    • Asthma Biological Mother    • COPD Biological Mother        SOCIAL HISTORY      Social History     Socioeconomic History   • Marital status: Single     Spouse name: None   • Number of children: None   • Years of education: None   • Highest education level: None   Occupational History   • None   Social Needs   • Financial resource strain: None   • Food insecurity     Worry: None     Inability: None   • Transportation needs     Medical: None     Non-medical: None   Tobacco Use   • Smoking status: Never Smoker   • Smokeless tobacco: Never Used   Substance and Sexual Activity   • Alcohol use: No   • Drug use: Never   • Sexual activity: Not Currently      Birth control/protection: None   Lifestyle   • Physical activity     Days per week: None     Minutes per session: None   • Stress: None   Relationships   • Social connections     Talks on phone: None     Gets together: None     Attends Jew service: None     Active member of club or organization: None     Attends meetings of clubs or organizations: None     Relationship status: None   • Intimate partner violence     Fear of current or ex partner: None     Emotionally abused: None     Physically abused: None     Forced sexual activity: None   Other Topics Concern   • None   Social History Narrative   • None       REVIEW OF SYSTEMS      .Review of Systems   Constitutional: Negative for chills, fever and unexpected weight change.   HENT: Negative for congestion, ear pain, sore throat and trouble swallowing.    Eyes: Negative for redness and visual disturbance.   Respiratory: Negative for cough, chest tightness, shortness of breath and wheezing.    Cardiovascular: Negative for chest pain, palpitations and leg swelling.   Gastrointestinal: Negative for abdominal pain, blood in stool, constipation, diarrhea, nausea and vomiting.   Genitourinary: Negative for difficulty urinating and hematuria.   Musculoskeletal: Negative for arthralgias and joint swelling.   Skin: Negative for color change and rash.   Neurological: Negative for dizziness and headaches.   Hematological: Negative for adenopathy.   Psychiatric/Behavioral: The patient is not nervous/anxious.        PHYSICAL EXAMINATION      Vitals:    01/19/21 1228   BP: 106/68   Pulse: 93   Temp: (!) 35.8 °C (96.5 °F)   SpO2: 99%   Weight: 88 kg (194 lb)     Body mass index is 34.37 kg/m².    Physical Exam  Constitutional:       Appearance: She is well-developed.   HENT:      Head: Normocephalic.      Right Ear: External ear normal.      Left Ear: External ear normal.      Nose: Nose normal.   Eyes:      Conjunctiva/sclera: Conjunctivae normal.      Pupils: Pupils  are equal, round, and reactive to light.   Neck:      Musculoskeletal: Normal range of motion.   Cardiovascular:      Rate and Rhythm: Normal rate and regular rhythm.      Heart sounds: Normal heart sounds.   Pulmonary:      Effort: Pulmonary effort is normal.      Breath sounds: Normal breath sounds.   Skin:     General: Skin is warm and dry.   Neurological:      Mental Status: She is alert and oriented to person, place, and time.   Psychiatric:         Behavior: Behavior normal.            ASSESSMENT AND PLAN   Assessment/Plan   Problem List Items Addressed This Visit        Endocrine/Metabolic    Class 1 obesity due to excess calories without serious comorbidity with body mass index (BMI) of 34.0 to 34.9 in adult     You have been diagnosed with obesity based on your most recent body mass index.  You are encouraged to reduce your weight by consuming a calorie appropriate diet of high quality food low in fat and carbs.  Engage in regular aerobic and resistance exercise as tolerated to enhance the weight reduction.  Maintain a food and exercise log for future reference.  Contact your insurance company to explore coverage of a nutritional consultation.   A few of the serious risks and consequences associated with obesity include stroke, heart attack and premature death.     Weight Loss Monitoring:    Tools Needed:   1. Accurate digital scale  2. Notebook  3. Pencil    Step 1  Monitor your weight. What you don't track gets neglected. Weigh yourself once weekly on the same day of the week at the same time of day. Be consistent.    Step 2  Write that day's weight down on a notebook you will use to track your progress.    Step 3  Review your progress. The goal should be to lose at least 1 pound per week.       Step 4  Analyze your progress. If you did not lose a pound in the previous week but think you are on the right track stick with what you are doing for another week. If you don't lose 1 pound after 2 weeks you need  "to rethink what you are doing because it is not giving you the expected results. After making the necessary adjustments go back to step 1.            Other    Routine medical exam - Primary     Wellness Recommendations:    Exercise: engaging in regular physical activity is extremely important to preserve your functional capacity throughout your lifetime. Successful exercise is dependent upon 3 elements. Think \"DIF\". \"D\" = duration of exercise - 20 to 30 minutes per session. \"I\" = intensity - moderate intensity is preferred. You shouldn't be out of breath, nor should you be able to speak normally. Slightly winded is how I would put it. \"F\" = frequency - at least 3-5 times per week. Once a week won't cut it. Start our slow and gradually increase your activity to achieve the universal goal of 150 minutes of aerobic activity per week and 2 sessions of resistance exercise weekly would be ideal.     Eating: Importance of consuming a healthy diet should be emphasized in your health plan. Adoption of high quality, low fat, low carb foods was emphasized as was the importance of portion control. Take a look at the Mediterranean Diet - proven year after year as the healthiest diet.    Substance Abuse: refrain from tobacco usage and vaping products as these can result in significant injury. If you choose to consume alcohol keep consumption within moderation. This is defined as recommendations 1 alcoholic drink per day. One drink is equal to 12 oz 4% beer, 6 oz wine or 1.5 oz spirits). The use of illicit drugs should never be considered. Use extreme caution when being prescribed opioid containing medications as these can easily become addictive and lead to illicit drug use.    Complete all of the recommended health maintenance measures we discussed during today's visit.     Schedule regular dental examinations and don't forget to brush and floss your teeth daily.    Enhance your safety by wearing seat belts, checking your smoke " and CO detectors and monitoring living space for potential fall risks.     Practice safe sex where applicable.         Relevant Orders    CBC and Differential    Comprehensive metabolic panel    Lipid panel    BI SCREENING MAMMOGRAM BILATERAL(TOMOSYNTHESIS)               Matt Krueger DO  01/19/21  12:53 PM

## 2021-01-19 NOTE — ASSESSMENT & PLAN NOTE
You have been diagnosed with obesity based on your most recent body mass index.  You are encouraged to reduce your weight by consuming a calorie appropriate diet of high quality food low in fat and carbs.  Engage in regular aerobic and resistance exercise as tolerated to enhance the weight reduction.  Maintain a food and exercise log for future reference.  Contact your insurance company to explore coverage of a nutritional consultation.   A few of the serious risks and consequences associated with obesity include stroke, heart attack and premature death.     Weight Loss Monitoring:    Tools Needed:   1. Accurate digital scale  2. Notebook  3. Pencil    Step 1  Monitor your weight. What you don't track gets neglected. Weigh yourself once weekly on the same day of the week at the same time of day. Be consistent.    Step 2  Write that day's weight down on a notebook you will use to track your progress.    Step 3  Review your progress. The goal should be to lose at least 1 pound per week.       Step 4  Analyze your progress. If you did not lose a pound in the previous week but think you are on the right track stick with what you are doing for another week. If you don't lose 1 pound after 2 weeks you need to rethink what you are doing because it is not giving you the expected results. After making the necessary adjustments go back to step 1.

## 2021-01-21 RX ORDER — METFORMIN HYDROCHLORIDE 750 MG/1
750 TABLET, EXTENDED RELEASE ORAL 2 TIMES DAILY WITH MEALS
Qty: 180 TABLET | Refills: 3 | Status: SHIPPED | OUTPATIENT
Start: 2021-01-21 | End: 2022-04-20

## 2021-01-21 RX ORDER — SPIRONOLACTONE 50 MG/1
50 TABLET, FILM COATED ORAL
Qty: 180 TABLET | Refills: 3 | Status: SHIPPED | OUTPATIENT
Start: 2021-01-21 | End: 2022-04-20

## 2021-01-21 RX ORDER — CITALOPRAM 10 MG/1
10 TABLET ORAL
Qty: 90 TABLET | Refills: 3 | Status: SHIPPED | OUTPATIENT
Start: 2021-01-21 | End: 2022-04-20

## 2021-03-05 DIAGNOSIS — Z12.39 BREAST SCREENING: Primary | ICD-10-CM

## 2021-03-17 DIAGNOSIS — Z12.31 SCREENING MAMMOGRAM, ENCOUNTER FOR: Primary | ICD-10-CM

## 2021-11-17 DIAGNOSIS — Z12.31 BREAST CANCER SCREENING BY MAMMOGRAM: Primary | ICD-10-CM

## 2021-12-02 LAB
ALBUMIN SERPL-MCNC: 4.5 G/DL (ref 3.8–4.8)
ALBUMIN/GLOB SERPL: 1.7 {RATIO} (ref 1.2–2.2)
ALP SERPL-CCNC: 68 IU/L (ref 44–121)
ALT SERPL-CCNC: 8 IU/L (ref 0–32)
AST SERPL-CCNC: 15 IU/L (ref 0–40)
BASOPHILS # BLD AUTO: 0 X10E3/UL (ref 0–0.2)
BASOPHILS NFR BLD AUTO: 1 %
BILIRUB SERPL-MCNC: 0.5 MG/DL (ref 0–1.2)
BUN SERPL-MCNC: 12 MG/DL (ref 6–24)
BUN/CREAT SERPL: 13 (ref 9–23)
CALCIUM SERPL-MCNC: 9.2 MG/DL (ref 8.7–10.2)
CHLORIDE SERPL-SCNC: 102 MMOL/L (ref 96–106)
CHOLEST SERPL-MCNC: 213 MG/DL (ref 100–199)
CO2 SERPL-SCNC: 23 MMOL/L (ref 20–29)
CREAT SERPL-MCNC: 0.91 MG/DL (ref 0.57–1)
EOSINOPHIL # BLD AUTO: 0.2 X10E3/UL (ref 0–0.4)
EOSINOPHIL NFR BLD AUTO: 2 %
ERYTHROCYTE [DISTWIDTH] IN BLOOD BY AUTOMATED COUNT: 12.2 % (ref 11.7–15.4)
GLOBULIN SER CALC-MCNC: 2.7 G/DL (ref 1.5–4.5)
GLUCOSE SERPL-MCNC: 102 MG/DL (ref 65–99)
HCT VFR BLD AUTO: 38.3 % (ref 34–46.6)
HDLC SERPL-MCNC: 59 MG/DL
HGB BLD-MCNC: 12.4 G/DL (ref 11.1–15.9)
IMM GRANULOCYTES # BLD AUTO: 0 X10E3/UL (ref 0–0.1)
IMM GRANULOCYTES NFR BLD AUTO: 0 %
LAB CORP EGFR IF AFRICN AM: 91 ML/MIN/1.73
LAB CORP EGFR IF NONAFRICN AM: 79 ML/MIN/1.73
LDLC SERPL CALC-MCNC: 140 MG/DL (ref 0–99)
LYMPHOCYTES # BLD AUTO: 1.8 X10E3/UL (ref 0.7–3.1)
LYMPHOCYTES NFR BLD AUTO: 25 %
MCH RBC QN AUTO: 27 PG (ref 26.6–33)
MCHC RBC AUTO-ENTMCNC: 32.4 G/DL (ref 31.5–35.7)
MCV RBC AUTO: 83 FL (ref 79–97)
MONOCYTES # BLD AUTO: 0.5 X10E3/UL (ref 0.1–0.9)
MONOCYTES NFR BLD AUTO: 7 %
NEUTROPHILS # BLD AUTO: 4.7 X10E3/UL (ref 1.4–7)
NEUTROPHILS NFR BLD AUTO: 65 %
PLATELET # BLD AUTO: 304 X10E3/UL (ref 150–450)
POTASSIUM SERPL-SCNC: 4.3 MMOL/L (ref 3.5–5.2)
PROT SERPL-MCNC: 7.2 G/DL (ref 6–8.5)
RBC # BLD AUTO: 4.6 X10E6/UL (ref 3.77–5.28)
SODIUM SERPL-SCNC: 139 MMOL/L (ref 134–144)
TRIGL SERPL-MCNC: 81 MG/DL (ref 0–149)
VLDLC SERPL CALC-MCNC: 14 MG/DL (ref 5–40)
WBC # BLD AUTO: 7.1 X10E3/UL (ref 3.4–10.8)

## 2022-01-21 ENCOUNTER — OFFICE VISIT (OUTPATIENT)
Dept: FAMILY MEDICINE | Facility: CLINIC | Age: 42
End: 2022-01-21
Payer: COMMERCIAL

## 2022-01-21 ENCOUNTER — TELEPHONE (OUTPATIENT)
Dept: FAMILY MEDICINE | Facility: CLINIC | Age: 42
End: 2022-01-21

## 2022-01-21 VITALS
TEMPERATURE: 97.3 F | WEIGHT: 177.2 LBS | OXYGEN SATURATION: 98 % | DIASTOLIC BLOOD PRESSURE: 76 MMHG | BODY MASS INDEX: 31.4 KG/M2 | SYSTOLIC BLOOD PRESSURE: 120 MMHG | HEIGHT: 63 IN | HEART RATE: 91 BPM

## 2022-01-21 DIAGNOSIS — Z00.00 ROUTINE MEDICAL EXAM: Primary | ICD-10-CM

## 2022-01-21 DIAGNOSIS — E66.09 CLASS 1 OBESITY DUE TO EXCESS CALORIES WITHOUT SERIOUS COMORBIDITY WITH BODY MASS INDEX (BMI) OF 34.0 TO 34.9 IN ADULT: ICD-10-CM

## 2022-01-21 DIAGNOSIS — E28.2 POLYCYSTIC OVARIES: ICD-10-CM

## 2022-01-21 DIAGNOSIS — L70.0 ACNE VULGARIS: ICD-10-CM

## 2022-01-21 DIAGNOSIS — F41.9 ANXIETY DISORDER, UNSPECIFIED TYPE: ICD-10-CM

## 2022-01-21 DIAGNOSIS — E66.811 CLASS 1 OBESITY DUE TO EXCESS CALORIES WITHOUT SERIOUS COMORBIDITY WITH BODY MASS INDEX (BMI) OF 34.0 TO 34.9 IN ADULT: ICD-10-CM

## 2022-01-21 PROCEDURE — 3008F BODY MASS INDEX DOCD: CPT | Performed by: FAMILY MEDICINE

## 2022-01-21 PROCEDURE — 99396 PREV VISIT EST AGE 40-64: CPT | Performed by: FAMILY MEDICINE

## 2022-01-21 ASSESSMENT — ENCOUNTER SYMPTOMS
CHEST TIGHTNESS: 0
NERVOUS/ANXIOUS: 0
HEMATURIA: 0
DIARRHEA: 0
BLOOD IN STOOL: 0
SHORTNESS OF BREATH: 0
FEVER: 0
DIFFICULTY URINATING: 0
ADENOPATHY: 0
TROUBLE SWALLOWING: 0
JOINT SWELLING: 0
ARTHRALGIAS: 0
CONSTIPATION: 0
HEADACHES: 0
COLOR CHANGE: 0
WHEEZING: 0
UNEXPECTED WEIGHT CHANGE: 0
EYE REDNESS: 0
SORE THROAT: 0
DIZZINESS: 0
CHILLS: 0
NAUSEA: 0
COUGH: 0
PALPITATIONS: 0
ABDOMINAL PAIN: 0
VOMITING: 0

## 2022-01-21 NOTE — ASSESSMENT & PLAN NOTE
This condition is primarily being managed by specialty consultants. We have discussed the available consultant's reports. You should follow up with any treating specialists as directed. Take all prescribed or over the counter medications as directed.

## 2022-01-21 NOTE — ASSESSMENT & PLAN NOTE
Your acne is currently controlled with present treatment regimen. Continue to wash your face daily with a lower pH soap (Cetaphil) by applying the soap with your fingers and rinsing with warm (not hot) water. Any topical treatments can be applied after washing face. Regular use of daily topical benzoyl peroxide to the affected areas is recommended. If prescribed, never apply a topical tretinoin product at the same time as benzoyl peroxide. Take any prescribed or over the counter medications as directed. If your acne worsens schedule a follow up appointment.

## 2022-01-21 NOTE — ASSESSMENT & PLAN NOTE
You have been diagnosed with anxiety and from our discussion it appears to be adequately controlled. This is a common condition that can be treated with relaxation techniques, counseling and/or medication. Consider stopping or reducing consumption of products that contain caffeine, such as coffee, tea, cola and chocolate. Incorporate regular exercise into your lifestyle. Consume a calorie appropriate diet of high quality, low fat, low carb foods. Practice stress management techniques such as yoga, meditation or martial arts. Consider keeping a journal. Seek the assistance of a psychologist or psychiatrist if the above are not improving your symptoms or if we have agreed to refer you for evaluation. Take any prescribed medications as directed. Contact the office with any worsening of your symptoms. Keep all scheduled appointments.

## 2022-01-21 NOTE — PROGRESS NOTES
[facility]     CHIEF COMPLAINT   Treasure Winter is a 41 y.o. female who presents today for routine annual physical.   HISTORY OF PRESENT ILLNESS      HPI    Labs  Lab Results   Component Value Date    CREATININE 0.91 12/01/2021     Lab Results   Component Value Date    WBC 7.1 12/01/2021    HGB 12.4 12/01/2021    HCT 38.3 12/01/2021    MCV 83 12/01/2021     12/01/2021         No results found for: HGBA1C  No results found for: MICROALBUR, [id number]    PAST MEDICAL AND SURGICAL HISTORY        No past medical history on file.    No past surgical history on file.    MEDICATIONS        Current Outpatient Medications:   •  citalopram (celeXA) 10 mg tablet, Take 1 tablet (10 mg total) by mouth once daily., Disp: 90 tablet, Rfl: 3  •  metFORMIN XR (GLUCOPHAGE-XR) 750 mg 24 hr tablet, Take 1 tablet (750 mg total) by mouth 2 (two) times a day with meals., Disp: 180 tablet, Rfl: 3  •  spironolactone (ALDACTONE) 50 mg tablet, Take 1 tablet (50 mg total) by mouth 2 (two) times a day., Disp: 180 tablet, Rfl: 3    ALLERGIES      Patient has no known allergies.    FAMILY HISTORY      Family History   Problem Relation Age of Onset   • Hypertension Biological Mother    • Diabetes Biological Mother    • Hyperlipidemia Biological Mother    • Arthritis Biological Mother    • Asthma Biological Mother    • COPD Biological Mother        SOCIAL HISTORY      Social History     Socioeconomic History   • Marital status: Single     Spouse name: None   • Number of children: None   • Years of education: None   • Highest education level: None   Occupational History   • None   Tobacco Use   • Smoking status: Never Smoker   • Smokeless tobacco: Never Used   Substance and Sexual Activity   • Alcohol use: No   • Drug use: Never   • Sexual activity: Not Currently     Birth control/protection: None   Other Topics Concern   • None   Social History Narrative   • None     Social Determinants of Health     Financial Resource  "Strain: Not on file   Food Insecurity: Not on file   Transportation Needs: Not on file   Physical Activity: Not on file   Stress: Not on file   Social Connections: Not on file   Intimate Partner Violence: Not on file   Housing Stability: Not on file       REVIEW OF SYSTEMS      .Review of Systems   Constitutional: Negative for chills, fever and unexpected weight change.   HENT: Negative for congestion, ear pain, sore throat and trouble swallowing.    Eyes: Negative for redness and visual disturbance.   Respiratory: Negative for cough, chest tightness, shortness of breath and wheezing.    Cardiovascular: Negative for chest pain, palpitations and leg swelling.   Gastrointestinal: Negative for abdominal pain, blood in stool, constipation, diarrhea, nausea and vomiting.   Genitourinary: Negative for difficulty urinating and hematuria.   Musculoskeletal: Negative for arthralgias and joint swelling.   Skin: Negative for color change and rash.   Neurological: Negative for dizziness and headaches.   Hematological: Negative for adenopathy.   Psychiatric/Behavioral: The patient is not nervous/anxious.        PHYSICAL EXAMINATION      Vitals:    01/21/22 1122   Pulse: 91   Temp: 36.3 °C (97.3 °F)   SpO2: 98%   Weight: 80.4 kg (177 lb 3.2 oz)   Height: 1.6 m (5' 3\")     Body mass index is 31.39 kg/m².    Physical Exam  Constitutional:       Appearance: She is well-developed.   HENT:      Head: Normocephalic.      Right Ear: External ear normal.      Left Ear: External ear normal.      Nose: Nose normal.   Eyes:      Conjunctiva/sclera: Conjunctivae normal.      Pupils: Pupils are equal, round, and reactive to light.   Cardiovascular:      Rate and Rhythm: Normal rate and regular rhythm.      Heart sounds: Normal heart sounds.   Pulmonary:      Effort: Pulmonary effort is normal.      Breath sounds: Normal breath sounds.   Musculoskeletal:      Cervical back: Normal range of motion.   Skin:     General: Skin is warm and dry. "   Neurological:      Mental Status: She is alert and oriented to person, place, and time.   Psychiatric:         Behavior: Behavior normal.            ASSESSMENT AND PLAN   Assessment/Plan   Problem List Items Addressed This Visit        Genitourinary    Polycystic ovaries     This condition is primarily being managed by specialty consultants. We have discussed the available consultant's reports. You should follow up with any treating specialists as directed. Take all prescribed or over the counter medications as directed.             Endocrine/Metabolic    Class 1 obesity due to excess calories without serious comorbidity with body mass index (BMI) of 34.0 to 34.9 in adult     You have been diagnosed with obesity based on your most recent body mass index.  You are encouraged to reduce your weight by consuming a calorie appropriate diet of high quality food low in fat and carbs.  Engage in regular aerobic and resistance exercise as tolerated to enhance the weight reduction.  Maintain a food and exercise log for future reference.  Contact your insurance company to explore coverage of a nutritional consultation.   A few of the serious risks and consequences associated with obesity include stroke, heart attack and premature death.     Weight Loss Monitoring:    Tools Needed:   1. Accurate digital scale  2. Notebook  3. Pencil    Step 1  Monitor your weight. What you don't track gets neglected. Weigh yourself once weekly on the same day of the week at the same time of day. Be consistent.    Step 2  Write that day's weight down on a notebook you will use to track your progress.    Step 3  Review your progress. The goal should be to lose at least 1 pound per week.       Step 4  Analyze your progress. If you did not lose a pound in the previous week but think you are on the right track stick with what you are doing for another week. If you don't lose 1 pound after 2 weeks you need to rethink what you are doing because it  "is not giving you the expected results. After making the necessary adjustments go back to step 1.            Other    Anxiety disorder     You have been diagnosed with anxiety and from our discussion it appears to be adequately controlled. This is a common condition that can be treated with relaxation techniques, counseling and/or medication. Consider stopping or reducing consumption of products that contain caffeine, such as coffee, tea, cola and chocolate. Incorporate regular exercise into your lifestyle. Consume a calorie appropriate diet of high quality, low fat, low carb foods. Practice stress management techniques such as yoga, meditation or martial arts. Consider keeping a journal. Seek the assistance of a psychologist or psychiatrist if the above are not improving your symptoms or if we have agreed to refer you for evaluation. Take any prescribed medications as directed. Contact the office with any worsening of your symptoms. Keep all scheduled appointments.             Acne vulgaris     Your acne is currently controlled with present treatment regimen. Continue to wash your face daily with a lower pH soap (Cetaphil) by applying the soap with your fingers and rinsing with warm (not hot) water. Any topical treatments can be applied after washing face. Regular use of daily topical benzoyl peroxide to the affected areas is recommended. If prescribed, never apply a topical tretinoin product at the same time as benzoyl peroxide. Take any prescribed or over the counter medications as directed. If your acne worsens schedule a follow up appointment.         Routine medical exam - Primary     Wellness Recommendations:    Exercise: engaging in regular physical activity is extremely important to preserve your functional capacity throughout your lifetime. Successful exercise is dependent upon 3 elements. Think \"DIF\". \"D\" = duration of exercise - 20 to 30 minutes per session. \"I\" = intensity - moderate intensity is " "preferred. You shouldn't be out of breath, nor should you be able to speak normally. Slightly winded is how I would put it. \"F\" = frequency - at least 3-5 times per week. Once a week won't cut it. Start our slow and gradually increase your activity to achieve the universal goal of 150 minutes of aerobic activity per week and 2 sessions of resistance exercise weekly would be ideal.     Eating: Importance of consuming a healthy diet should be emphasized in your health plan. Adoption of high quality, low fat, low carb foods was emphasized as was the importance of portion control. Take a look at the Mediterranean Diet - proven year after year as the healthiest diet.    Substance Abuse: refrain from tobacco usage and vaping products as these can result in significant injury. If you choose to consume alcohol keep consumption within moderation. This is defined as recommendations 1 alcoholic drink per day. One drink is equal to 12 oz 4% beer, 6 oz wine or 1.5 oz spirits). The use of illicit drugs should never be considered. Use extreme caution when being prescribed opioid containing medications as these can easily become addictive and lead to illicit drug use.    Complete all of the recommended health maintenance measures we discussed during today's visit.     Schedule regular dental examinations and don't forget to brush and floss your teeth daily.    Enhance your safety by wearing seat belts, checking your smoke and CO detectors and monitoring living space for potential fall risks.     Practice safe sex where applicable.                    Matt Krueger,   01/21/22  11:45 AM          "

## 2022-01-21 NOTE — TELEPHONE ENCOUNTER
[facility] at [address].    MRR signed and placed on [facility] desk to obtain report.

## 2022-04-20 RX ORDER — SPIRONOLACTONE 50 MG/1
TABLET, FILM COATED ORAL
Qty: 180 TABLET | Refills: 3 | Status: SHIPPED | OUTPATIENT
Start: 2022-04-20

## 2022-04-20 RX ORDER — METFORMIN HYDROCHLORIDE 750 MG/1
TABLET, EXTENDED RELEASE ORAL
Qty: 180 TABLET | Refills: 3 | Status: SHIPPED | OUTPATIENT
Start: 2022-04-20

## 2022-04-20 RX ORDER — CITALOPRAM 10 MG/1
TABLET ORAL
Qty: 90 TABLET | Refills: 3 | Status: SHIPPED | OUTPATIENT
Start: 2022-04-20

## 2022-07-20 ENCOUNTER — APPOINTMENT (OUTPATIENT)
Dept: RADIOLOGY | Facility: CLINIC | Age: 42
End: 2022-07-20
Payer: COMMERCIAL

## 2022-07-20 PROCEDURE — 71046 X-RAY EXAM CHEST 2 VIEWS: CPT
